# Patient Record
Sex: MALE
[De-identification: names, ages, dates, MRNs, and addresses within clinical notes are randomized per-mention and may not be internally consistent; named-entity substitution may affect disease eponyms.]

---

## 2018-04-04 ENCOUNTER — HOSPITAL ENCOUNTER (EMERGENCY)
Dept: HOSPITAL 14 - H.ER | Age: 28
Discharge: HOME | End: 2018-04-04
Payer: COMMERCIAL

## 2018-04-04 VITALS — SYSTOLIC BLOOD PRESSURE: 115 MMHG | HEART RATE: 70 BPM | DIASTOLIC BLOOD PRESSURE: 70 MMHG

## 2018-04-04 VITALS — OXYGEN SATURATION: 99 % | RESPIRATION RATE: 18 BRPM | TEMPERATURE: 97 F

## 2018-04-04 DIAGNOSIS — Z23: ICD-10-CM

## 2018-04-04 DIAGNOSIS — S91.312A: Primary | ICD-10-CM
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
## 2018-04-04 NOTE — CP.PCM.CON
History of Present Illness





- History of Present Illness


History of Present Illness: 


28 y/o male with no significant PMHx seen and examined in ED after consultation 

for left ankle laceration. He states he slipped down a spiral staircase and the 

skin got caught on a sharp edge. States he was able to walk just fine 

afterwards and still is, but noticed a deep cut to the back of the ankle which 

started bleeding. He was brought to the ED by his brother. States the 

laceration occurred approx 90 min ago. States he received tetanus upon arrival 

to the ED. Denies F/C/N/V/CP/SOB





PSHx: none


All: NKDA


SocHx: social EtOH; denies cigarette or illicit drug use


FamHx: noncontributory





Review of Systems





- Review of Systems


All systems: reviewed and no additional remarkable complaints except (per HPI)





Past Patient History





- Infectious Disease


Hx of Infectious Diseases: None





- Past Social History


Alcohol: Social


Drugs: Denies





- PSYCHIATRIC


Hx Substance Use: No





- SURGICAL HISTORY


Hx Surgeries: No





- ANESTHESIA


Hx Anesthesia: No





Meds


Allergies/Adverse Reactions: 


 Allergies











Allergy/AdvReac Type Severity Reaction Status Date / Time


 


No Known Allergies Allergy   Verified 04/04/18 08:27














Physical Exam





- Constitutional


Appears: Well, Non-toxic, No Acute Distress





- Extremities Exam


Additional comments: 


LLE focused exam:


Vasc: DP/PT pulses palpable 2/4. Temperature gradient warm to cool. Mild 

localized edema noted to posterior heel just proximal to Achilles tendon 

insertion. 


Derm: 7cm laceration noted to posterior heel extending down to the level of 

subcutaneous tissue. Sanguinous drainage noted with application of pressure. 

Mild zayda wound erythema noted. No purulence, no malodor, no fluctuance. 


Neuro: Protective sensation grossly intact


Ortho: Mild tenderness to palpation of posterior heel and passive ankle joint 

dorsiflexion. Pt able to perform active and passive ankle ROM with no 

limitations. Dudley test negative for Achilles injury. 








- Neurological Exam


Neurological exam: Alert, Oriented x3





- Psychiatric Exam


Psychiatric exam: Normal Affect, Normal Mood





Results





- Vital Signs


Recent Vital Signs: 


 Last Vital Signs











Temp  97 F L  04/04/18 08:28


 


Pulse  76   04/04/18 08:28


 


Resp  18   04/04/18 08:28


 


BP  118/75   04/04/18 08:28


 


Pulse Ox  99   04/04/18 09:14














Assessment & Plan





- Assessment and Plan (Free Text)


Assessment: 


28 y/o male with left posterior heel laceration secondary to trauma





Plan: 


Pt seen and evaluated in ED


Discussed with attending Dr. Antonio


Tetanus shot given in ED


Wound copiously irrigated with betadine saline solution


16cc of 1% Lidocaine solution injected in local fashion to posterior L heel


3-0 Vicryl sutures utilized to close subcutaneous tissue


4-0 Nylon sutures utilized to reapproximate skin edges


Pt tolerated procedure without incident


Wound dressed with xeroform, DSD and ACE bandage


Pt dispensed surgical shoe and crutches and advised to remain NWB and keep his 

dressing clean/dry/intact


Pt may shower with use of shower bag to cover his bandage


Pt to follow up with Dr. Antonio in his office within 1 week of hospital 

discharge


Thank you for this consult

## 2018-04-04 NOTE — ED PDOC
Lower Extremity Pain/Injury


Time Seen by Provider: 04/04/18 08:37


Chief Complaint (Nursing): Lower Extremity Problem/Injury


Chief Complaint (Provider): left heel laceration


History Per: Patient


History/Exam Limitations: no limitations


Onset/Duration Of Symptoms: Hrs (today)


Current Symptoms Are (Timing): Still Present


Additional Complaint(s): 





Renny Alonso is a 27 year old male, with no significant past medical history, 

who presents to the emergency department for a left heel laceration onset 

today. Patient reports he was walking down the stairs when the back of his heel 

hit the metal steps. Patient is not up to date with tetanus shot. He denies any 

other injuries or medical complaints.





PMD: None provided. 





- Ankle/Foot


Description Of Injury: Laceration





Past Medical History


Reviewed: Historical Data, Nursing Documentation, Vital Signs


Vital Signs: 





 Last Vital Signs











Temp  97 F L  04/04/18 08:28


 


Pulse  76   04/04/18 08:28


 


Resp  18   04/04/18 08:28


 


BP  118/75   04/04/18 08:28


 


Pulse Ox  99   04/04/18 08:28














- Medical History


PMH: No Chronic Diseases





- Surgical History


Surgical History: No Surg Hx





- Family History


Family History: States: Unknown Family Hx





- Social History


Current smoker - smoking cessation education provided: No


Alcohol: Social


Drugs: Denies





- Home Medications


Home Medications: 


 Ambulatory Orders











 Medication  Instructions  Recorded


 


Cephalexin [cephalexin] 500 mg PO BID #20 cap 04/04/18


 


No Known Home Med  04/04/18














- Allergies


Allergies/Adverse Reactions: 


 Allergies











Allergy/AdvReac Type Severity Reaction Status Date / Time


 


No Known Allergies Allergy   Verified 04/04/18 08:27














Review of Systems


Musculoskeletal: Positive for: Leg Pain (heel laceration)





Physical Exam





- Reviewed


Nursing Documentation Reviewed: Yes


Vital Signs Reviewed: Yes





- Physical Exam


Appears: Positive for: Well, Non-toxic, No Acute Distress


Head Exam: Positive for: ATRAUMATIC, NORMAL INSPECTION, NORMOCEPHALIC


Skin: Positive for: Normal Color, Warm, Dry


Eye Exam: Positive for: Normal appearance


Neck: Positive for: Painless ROM


Extremity: Positive for: Normal ROM (lower extremities), Other (7 cm laceration 

to left heel. Dudley test normal. No motor/sensory deficits ).  Negative for: 

Calf Tenderness, Deformity, Swelling


Neurologic/Psych: Positive for: Alert, Oriented.  Negative for: Motor/Sensory 

Deficits





- ECG


O2 Sat by Pulse Oximetry: 99 (RA)


Pulse Ox Interpretation: Normal





Medical Decision Making


Medical Decision Making: 





Initial Impression: heel laceration





Initial Plan:





--Adacel 0.5 ml IM


--Reevaluation








09:05


-Spoke with podiatry who will come see the patient for evaluation














--------------------------------------------------------------------------------

-----------------


Scribe Attestation:


Documented by Tony Prajapati, acting as a scribe for Dang Gaona MD





Provider Scribe Attestation:


All medical record entries made by the Scribe were at my direction and 

personally dictated by me. I have reviewed the chart and agree that the record 

accurately reflects my personal performance of the history, physical exam, 

medical decision making, and the department course for this patient. I have 

also personally directed, reviewed, and agree with the discharge instructions 

and disposition.





patient seen by podiatry. wound examined and closed by resident. patient placed 

on non-weight bearing status with crutches. Patient to followup with Dr. Antonio.





Disposition





- Clinical Impression


Clinical Impression: 


 Laceration of ankle








- Patient ED Disposition


Is Patient to be Admitted: No


Doctor Will See Patient In The: Office


Counseled Patient/Family Regarding: Diagnosis, Need For Followup, Rx Given





- Disposition


Referrals: 


Manan Antonio MD [Staff Provider] - 


[facility] [Outside]


Disposition: Routine/Home


Disposition Time: 10:45


Condition: IMPROVED


Prescriptions: 


Cephalexin [cephalexin] 500 mg PO BID #20 cap


Forms:  [employer] (English), [facility] ED School/Work Excuse





- POA


Present On Arrival: Falls Or Trauma